# Patient Record
Sex: FEMALE | Race: WHITE | NOT HISPANIC OR LATINO
[De-identification: names, ages, dates, MRNs, and addresses within clinical notes are randomized per-mention and may not be internally consistent; named-entity substitution may affect disease eponyms.]

---

## 2022-01-27 ENCOUNTER — APPOINTMENT (OUTPATIENT)
Dept: RADIATION ONCOLOGY | Facility: CLINIC | Age: 62
End: 2022-01-27
Payer: COMMERCIAL

## 2022-01-27 VITALS
WEIGHT: 186 LBS | DIASTOLIC BLOOD PRESSURE: 97 MMHG | TEMPERATURE: 98 F | RESPIRATION RATE: 16 BRPM | HEART RATE: 79 BPM | HEIGHT: 65 IN | SYSTOLIC BLOOD PRESSURE: 157 MMHG | OXYGEN SATURATION: 99 % | BODY MASS INDEX: 30.99 KG/M2

## 2022-01-27 DIAGNOSIS — Z78.9 OTHER SPECIFIED HEALTH STATUS: ICD-10-CM

## 2022-01-27 DIAGNOSIS — Z87.39 PERSONAL HISTORY OF OTHER DISEASES OF THE MUSCULOSKELETAL SYSTEM AND CONNECTIVE TISSUE: ICD-10-CM

## 2022-01-27 DIAGNOSIS — Z86.39 PERSONAL HISTORY OF OTHER ENDOCRINE, NUTRITIONAL AND METABOLIC DISEASE: ICD-10-CM

## 2022-01-27 DIAGNOSIS — Z80.3 FAMILY HISTORY OF MALIGNANT NEOPLASM OF BREAST: ICD-10-CM

## 2022-01-27 DIAGNOSIS — Z86.79 PERSONAL HISTORY OF OTHER DISEASES OF THE CIRCULATORY SYSTEM: ICD-10-CM

## 2022-01-27 DIAGNOSIS — Z86.59 PERSONAL HISTORY OF OTHER MENTAL AND BEHAVIORAL DISORDERS: ICD-10-CM

## 2022-01-27 DIAGNOSIS — Z87.19 PERSONAL HISTORY OF OTHER DISEASES OF THE DIGESTIVE SYSTEM: ICD-10-CM

## 2022-01-27 PROBLEM — Z00.00 ENCOUNTER FOR PREVENTIVE HEALTH EXAMINATION: Status: ACTIVE | Noted: 2022-01-27

## 2022-01-27 PROCEDURE — 99204 OFFICE O/P NEW MOD 45 MIN: CPT | Mod: 25

## 2022-01-27 RX ORDER — FLUOXETINE HYDROCHLORIDE 10 MG/1
10 CAPSULE ORAL
Refills: 0 | Status: ACTIVE | COMMUNITY

## 2022-01-27 RX ORDER — METOPROLOL SUCCINATE 200 MG/1
TABLET, EXTENDED RELEASE ORAL
Refills: 0 | Status: ACTIVE | COMMUNITY

## 2022-01-27 RX ORDER — ROSUVASTATIN CALCIUM 10 MG/1
10 TABLET, FILM COATED ORAL
Refills: 0 | Status: ACTIVE | COMMUNITY

## 2022-01-27 NOTE — HISTORY OF PRESENT ILLNESS
[FreeTextEntry1] : Mrs. Looney is a 60 yo female who presents for a newly diagnosed Left breast DCIS. \par 10/11/2021 Screening Bilateral Mammogram which revealed 10 mm cluster of microcalcifications in the left breast 9:00. \par 10/19/2021 Right Targeted Breast US there was a 16 x 15 x 9 mm  solid mass right breast retroareolar. USGCBx recommended. \par 11/10/2021 Stereotactic Biopsy - Pathology revealed Left Breast DCIS, intermediate to high grade, solid pattern with necrosis and calcifications which was concordant, ans she underwent a targeted right breast US on 10/19/2021 which revealed a solid right breast retroareolar mass for which USGCBx was then performed on 11/2/2021 which revealed focal adenosis and columnar cell change with calcifications in a background of dense stromal fibrosis, this is benign and concordant.  \par 11/24/2021 MRI Bilateral Breasts ([facility]) revealed Left Breast: postbiopsy changes with a hematoma at 9:00. Nodular linear enhancement extending from the biopsy site, this may represent fibrocystic changes however. \par Right Breast: no evidence suspicious findings. \par 1/5/2022 (DR PHILLIPS)  She underwent a Left breast mag seed localized lumpectomy, local tissue transfer, mapping of left axillary sentinel lymph nodes. \par Pathology revealed DCIS, 23 mm, 4 mm, 3 mm, and 2 mm, solid, intermediate to high grade, central necrosis present, Distance from closest margins: 5 mm from final superior surface and distance from other margins > 10 mm, 1 SLN negative, ER- 1+ low positive, ND-. \par She saw Dr. Michaud on 1/26/2022 and discussed starting endocrine therapy after RT. \par She is feeling well since the surgery. She denies any breast pain. She has full ROM of the left arm. She has not had any issues since the surgery. She is currently having a flare up of her PMR. She is seeing the Rheumatologist today following this appointment.

## 2022-01-27 NOTE — OB/GYN HISTORY
[History of Hormone Replacement Therapy] : a history of hormone replacement therapy [Currently In Menopause] : currently in menopause [Menopause Age: ____] : patient was [unfilled] years old at menopause [___] : Living: [unfilled]

## 2022-01-27 NOTE — VITALS
[Maximal Pain Intensity: 0/10] : 0/10 [Least Pain Intensity: 0/10] : 0/10 [NoTreatment Scheduled] : no treatment scheduled [70: Cares for self; unalbe to carry on normal activity or do active work.] : 70: Cares for self; unable to carry on normal activity or do active work. [ECOG Performance Status: 2 - Ambulatory and capable of all self care but unable to carry out any work activities] : Performance Status: 2 - Ambulatory and capable of all self care but unable to carry out any work activities. Up and about more than 50% of waking hours [Date: ____________] : Patient's last distress assessment performed on [unfilled]. [4 - Distress Level] : Distress Level: 4

## 2022-01-27 NOTE — PHYSICAL EXAM
[Breast Appearance] : normal in appearance [Symmetric] : breasts are symmetric [Breast Palpation Mass] : no palpable masses [Breast Abnormal Lactation (Galactorrhea)] : no nipple discharge [No UE Edema] : there is no upper extremity edema [Normal] : oriented to person, place and time, the affect was normal, the mood was normal and not anxious [de-identified] : Well-healed lumpectomy scar on left side with no underlying induration.  No lymph nodes palpable in left axilla

## 2022-03-08 ENCOUNTER — NON-APPOINTMENT (OUTPATIENT)
Age: 62
End: 2022-03-08

## 2022-03-08 VITALS
HEIGHT: 65 IN | RESPIRATION RATE: 16 BRPM | BODY MASS INDEX: 32.65 KG/M2 | DIASTOLIC BLOOD PRESSURE: 86 MMHG | SYSTOLIC BLOOD PRESSURE: 132 MMHG | WEIGHT: 196 LBS | TEMPERATURE: 98.2 F | HEART RATE: 71 BPM | OXYGEN SATURATION: 97 %

## 2022-03-08 NOTE — DISEASE MANAGEMENT
[Pathological] : TNM Stage: p [0] : 0 [TTNM] : 0 [NTNM] : 0 [MTNM] : 0 [de-identified] : Patient completed fractions 2/16, total dose of 530 delivered to her left breast.

## 2022-03-08 NOTE — PHYSICAL EXAM
[Normal] : oriented to person, place and time, the affect was normal, the mood was normal and not anxious [de-identified] : No erythema seen

## 2022-03-08 NOTE — HISTORY OF PRESENT ILLNESS
[FreeTextEntry1] : 3/8/2022\par Patient completed fractions 2/16, total dose of 530 delivered to her left breast. Annmarie any skin rash or irritation. c/o fatigue 7, out of 10 due to her polyalgia rheumatica, increased her prednisone dosage. Skin care education provided. sample of Calendular cream and brochure provided. Patient verbalized understanding.

## 2022-03-08 NOTE — VITALS
[Maximal Pain Intensity: 1/10] : 1/10 [Least Pain Intensity: 1/10] : 1/10 [NoTreatment Scheduled] : no treatment scheduled [80: Normal activity with effort; some signs or symptoms of disease.] : 80: Normal activity with effort; some signs or symptoms of disease.  [ECOG Performance Status: 2 - Ambulatory and capable of all self care but unable to carry out any work activities] : Performance Status: 2 - Ambulatory and capable of all self care but unable to carry out any work activities. Up and about more than 50% of waking hours

## 2022-03-15 ENCOUNTER — NON-APPOINTMENT (OUTPATIENT)
Age: 62
End: 2022-03-15

## 2022-03-15 VITALS
RESPIRATION RATE: 17 BRPM | BODY MASS INDEX: 32.65 KG/M2 | OXYGEN SATURATION: 99 % | DIASTOLIC BLOOD PRESSURE: 94 MMHG | WEIGHT: 196 LBS | HEIGHT: 65 IN | HEART RATE: 64 BPM | SYSTOLIC BLOOD PRESSURE: 164 MMHG | TEMPERATURE: 98 F

## 2022-03-15 NOTE — PHYSICAL EXAM
[Normal] : oriented to person, place and time, the affect was normal, the mood was normal and not anxious [de-identified] : Minimal erythema of the breast.  No desquamation

## 2022-03-15 NOTE — HISTORY OF PRESENT ILLNESS
[FreeTextEntry1] : 3/8/2022\par Patient completed fractions 2/16, total dose of 530 delivered to her left breast. Annmarie any skin rash or irritation. c/o fatigue 7, out of 10 due to her polyalgia rheumatica, increased her prednisone dosage. Skin care education provided. sample of Calendular cream and brochure provided. Patient verbalized understanding.\par \par 3/15/2022\par Patient completed fractions 7/16, total dose of 1855 delivered to her left breast. Annmarie any pain, skin rash or irritation. She is using Calendula cream BID.

## 2022-03-15 NOTE — VITALS
[Maximal Pain Intensity: 1/10] : 1/10 [Least Pain Intensity: 1/10] : 1/10 [NoTreatment Scheduled] : no treatment scheduled [90: Able to carry normal activity; minor signs or symptoms of disease.] : 90: Able to carry normal activity; minor signs or symptoms of disease.  [ECOG Performance Status: 1 - Restricted in physically strenuous activity but ambulatory and able to carry out work of a light or sedentary nature] : Performance Status: 1 - Restricted in physically strenuous activity but ambulatory and able to carry out work of a light or sedentary nature, e.g., light house work, office work

## 2022-03-15 NOTE — DISEASE MANAGEMENT
[Pathological] : TNM Stage: p [0] : 0 [TTNM] : 0 [NTNM] : 0 [MTNM] : 0 [de-identified] : Patient completed fractions 7/16, total dose of 1855 delivered to her left breast.

## 2022-03-21 ENCOUNTER — NON-APPOINTMENT (OUTPATIENT)
Age: 62
End: 2022-03-21

## 2022-03-21 VITALS
WEIGHT: 197.5 LBS | DIASTOLIC BLOOD PRESSURE: 92 MMHG | HEIGHT: 65 IN | RESPIRATION RATE: 17 BRPM | TEMPERATURE: 98 F | BODY MASS INDEX: 32.9 KG/M2 | HEART RATE: 72 BPM | SYSTOLIC BLOOD PRESSURE: 139 MMHG | OXYGEN SATURATION: 100 %

## 2022-03-21 NOTE — PHYSICAL EXAM
[Sclera] : the sclera and conjunctiva were normal [] : no respiratory distress [Normal] : oriented to person, place and time, the affect was normal, the mood was normal and not anxious [de-identified] : Mild erythema left breast

## 2022-03-21 NOTE — HISTORY OF PRESENT ILLNESS
[FreeTextEntry1] : 3/21/2022\par Patient completed fractions 11/16, total dose of 2915 delivered to her left breast. c/o L breast soreness, redness, and sensitivity. She feel some soreness on her tung also. c/o fatigue 5, out of 10. Denies any pain, or loss of appetite.

## 2022-03-21 NOTE — REVIEW OF SYSTEMS
[Pruritus: Grade 1 - Mild or localized; topical intervention indicated] : Pruritus: Grade 1 - Mild or localized; topical intervention indicated [Dermatitis Radiation: Grade 1 - Faint erythema or dry desquamation] : Dermatitis Radiation: Grade 1 - Faint erythema or dry desquamation [Fatigue: Grade 0] : Fatigue: Grade 0 [Breast Pain: Grade 0] : Breast Pain: Grade 0

## 2022-03-21 NOTE — VITALS
[Maximal Pain Intensity: 1/10] : 1/10 [Least Pain Intensity: 1/10] : 1/10 [90: Able to carry normal activity; minor signs or symptoms of disease.] : 90: Able to carry normal activity; minor signs or symptoms of disease.  [ECOG Performance Status: 1 - Restricted in physically strenuous activity but ambulatory and able to carry out work of a light or sedentary nature] : Performance Status: 1 - Restricted in physically strenuous activity but ambulatory and able to carry out work of a light or sedentary nature, e.g., light house work, office work

## 2022-03-21 NOTE — DISEASE MANAGEMENT
[Pathological] : TNM Stage: p [0] : 0 [TTNM] : 0 [NTNM] : 0 [MTNM] : 0 [de-identified] : Patient completed fractions 11/16, total dose of 2915 delivered to her left breast.

## 2022-03-25 ENCOUNTER — NON-APPOINTMENT (OUTPATIENT)
Age: 62
End: 2022-03-25

## 2022-03-28 ENCOUNTER — NON-APPOINTMENT (OUTPATIENT)
Age: 62
End: 2022-03-28

## 2022-03-28 VITALS
BODY MASS INDEX: 32.54 KG/M2 | DIASTOLIC BLOOD PRESSURE: 84 MMHG | TEMPERATURE: 97.3 F | HEART RATE: 82 BPM | OXYGEN SATURATION: 97 % | HEIGHT: 65 IN | WEIGHT: 195.3 LBS | RESPIRATION RATE: 17 BRPM | SYSTOLIC BLOOD PRESSURE: 125 MMHG

## 2022-03-28 NOTE — REVIEW OF SYSTEMS
[Fatigue: Grade 0] : Fatigue: Grade 0 [Breast Pain: Grade 1 - Mild pain] : Breast Pain: Grade 1 - Mild pain [Pruritus: Grade 2 - Intense or widespread; intermittent; skin changes from scratching (e.g., edema, papulation, excoriations, lichenification, oozing/crusts); oral intervention indicated; limiting instrumental ADL] : Pruritus: Grade 2 - Intense or widespread; intermittent; skin changes from scratching (e.g., edema, papulation, excoriations, lichenification, oozing/crusts); oral intervention indicated; limiting instrumental ADL [Dermatitis Radiation: Grade 2 - Moderate to brisk erythema; patchy moist desquamation, mostly confined to skin folds and creases; moderate edema] : Dermatitis Radiation: Grade 2 - Moderate to brisk erythema; patchy moist desquamation, mostly confined to skin folds and creases; moderate edema

## 2022-03-28 NOTE — VITALS
[Maximal Pain Intensity: 2/10] : 2/10 [Least Pain Intensity: 2/10] : 2/10 [OTC] : OTC [80: Normal activity with effort; some signs or symptoms of disease.] : 80: Normal activity with effort; some signs or symptoms of disease.  [ECOG Performance Status: 2 - Ambulatory and capable of all self care but unable to carry out any work activities] : Performance Status: 2 - Ambulatory and capable of all self care but unable to carry out any work activities. Up and about more than 50% of waking hours

## 2022-03-29 NOTE — HISTORY OF PRESENT ILLNESS
[FreeTextEntry1] : 3/21/2022\par Patient completed fractions 11/16, total dose of 2915 delivered to her left breast. c/o L breast soreness, redness, and sensitivity. She feel some soreness on her tung also. c/o fatigue 5, out of 10. Denies any pain, or loss of appetite. \par \par 3/28/2022\par Patient completed fractions 16/16, total dose of 4240 cGy delivered to her left breast. c/o L upper inner dermatitis, opening skin tear under her L breast. Denies fatigue and loss of appetite.

## 2022-03-29 NOTE — PHYSICAL EXAM
[Sclera] : the sclera and conjunctiva were normal [Normal] : oriented to person, place and time, the affect was normal, the mood was normal and not anxious [de-identified] : Moderate erythema left breast with focal patch of moist desquamation

## 2022-03-29 NOTE — DISEASE MANAGEMENT
[Pathological] : TNM Stage: p [0] : 0 [TTNM] : 0 [NTNM] : 0 [MTNM] : 0 [de-identified] : Patient completed fractions 16/16, total dose of 4240 cGy delivered to her left breast.

## 2022-03-30 ENCOUNTER — NON-APPOINTMENT (OUTPATIENT)
Age: 62
End: 2022-03-30

## 2022-03-30 VITALS
TEMPERATURE: 97.1 F | SYSTOLIC BLOOD PRESSURE: 131 MMHG | HEART RATE: 66 BPM | HEIGHT: 65 IN | OXYGEN SATURATION: 100 % | WEIGHT: 196 LBS | RESPIRATION RATE: 18 BRPM | BODY MASS INDEX: 32.65 KG/M2 | DIASTOLIC BLOOD PRESSURE: 90 MMHG

## 2022-03-30 NOTE — REVIEW OF SYSTEMS
[Nausea: Grade 0] : Nausea: Grade 0 [Vomiting: Grade 0] : Vomiting: Grade 0 [Fatigue: Grade 0] : Fatigue: Grade 0 [Breast Pain: Grade 1 - Mild pain] : Breast Pain: Grade 1 - Mild pain [Pruritus: Grade 2 - Intense or widespread; intermittent; skin changes from scratching (e.g., edema, papulation, excoriations, lichenification, oozing/crusts); oral intervention indicated; limiting instrumental ADL] : Pruritus: Grade 2 - Intense or widespread; intermittent; skin changes from scratching (e.g., edema, papulation, excoriations, lichenification, oozing/crusts); oral intervention indicated; limiting instrumental ADL [Dermatitis Radiation: Grade 2 - Moderate to brisk erythema; patchy moist desquamation, mostly confined to skin folds and creases; moderate edema] : Dermatitis Radiation: Grade 2 - Moderate to brisk erythema; patchy moist desquamation, mostly confined to skin folds and creases; moderate edema

## 2022-03-30 NOTE — DISEASE MANAGEMENT
[Pathological] : TNM Stage: p [0] : 0 [TTNM] : 0 [NTNM] : 0 [MTNM] : 0 [de-identified] : Patient completed fractions 16/16, total dose of 4240 cGy, BOOST fractions 2/4, total dose of 500 cGy delivered to her left breast.

## 2022-03-30 NOTE — PHYSICAL EXAM
[Normal] : oriented to person, place and time, the affect was normal, the mood was normal and not anxious [de-identified] : Brisk erythema of the breast.  Small area of moist desquamation in the inframammary region

## 2022-03-30 NOTE — VITALS
[Maximal Pain Intensity: 7/10] : 7/10 [Least Pain Intensity: 5/10] : 5/10 [OTC] : OTC [80: Normal activity with effort; some signs or symptoms of disease.] : 80: Normal activity with effort; some signs or symptoms of disease.  [ECOG Performance Status: 2 - Ambulatory and capable of all self care but unable to carry out any work activities] : Performance Status: 2 - Ambulatory and capable of all self care but unable to carry out any work activities. Up and about more than 50% of waking hours

## 2022-03-30 NOTE — HISTORY OF PRESENT ILLNESS
[FreeTextEntry1] : 3/21/2022\par Patient completed fractions 11/16, total dose of 2915 delivered to her left breast. c/o L breast soreness, redness, and sensitivity. She feel some soreness on her tung also. c/o fatigue 5, out of 10. Denies any pain, or loss of appetite. \par \par 3/28/2022\par Patient completed fractions 16/16, total dose of 4240 cGy delivered to her left breast. c/o L upper inner dermatitis, opening skin tear under her L breast. Denies fatigue and loss of appetite.   \par \par 3/30/2022\par Patient completed fractions 16/16, total dose of 4240 cGy, BOOST fractions 2/4, total dose of 500 cGy delivered to her left breast. c/o excoriated skin tear under her L breast. pain 6, out of 10. c/o fatigue. She is using Aquaphor BID and gauze.

## 2022-04-04 ENCOUNTER — APPOINTMENT (OUTPATIENT)
Dept: RADIATION ONCOLOGY | Facility: CLINIC | Age: 62
End: 2022-04-04
Payer: COMMERCIAL

## 2022-04-04 VITALS
HEART RATE: 74 BPM | RESPIRATION RATE: 16 BRPM | OXYGEN SATURATION: 99 % | DIASTOLIC BLOOD PRESSURE: 84 MMHG | SYSTOLIC BLOOD PRESSURE: 136 MMHG

## 2022-04-04 PROCEDURE — 99024 POSTOP FOLLOW-UP VISIT: CPT

## 2022-04-04 NOTE — REVIEW OF SYSTEMS
[Breast Pain: Grade 1 - Mild pain] : Breast Pain: Grade 1 - Mild pain [Pruritus: Grade 1 - Mild or localized; topical intervention indicated] : Pruritus: Grade 1 - Mild or localized; topical intervention indicated [Skin Hyperpigmentation: Grade 2 - Hyperpigmentation covering >10% BSA; associated psychosocial impact] : Skin Hyperpigmentation: Grade 2 - Hyperpigmentation covering >10% BSA; associated psychosocial impact [Dermatitis Radiation: Grade 2 - Moderate to brisk erythema; patchy moist desquamation, mostly confined to skin folds and creases; moderate edema] : Dermatitis Radiation: Grade 2 - Moderate to brisk erythema; patchy moist desquamation, mostly confined to skin folds and creases; moderate edema

## 2022-04-04 NOTE — VITALS
[Maximal Pain Intensity: 2/10] : 2/10 [NoTreatment Scheduled] : no treatment scheduled [OTC] : OTC [80: Normal activity with effort; some signs or symptoms of disease.] : 80: Normal activity with effort; some signs or symptoms of disease.  [ECOG Performance Status: 1 - Restricted in physically strenuous activity but ambulatory and able to carry out work of a light or sedentary nature] : Performance Status: 1 - Restricted in physically strenuous activity but ambulatory and able to carry out work of a light or sedentary nature, e.g., light house work, office work

## 2022-04-04 NOTE — PHYSICAL EXAM
[Sclera] : the sclera and conjunctiva were normal [] : no respiratory distress [Symmetric] : breasts are symmetric [Normal] : oriented to person, place and time, the affect was normal, the mood was normal and not anxious [de-identified] : Moderate-severe erythema left breast; patchy moist desquamation limited to one-third of the inframammary fold region

## 2022-04-04 NOTE — DISEASE MANAGEMENT
[Pathological] : TNM Stage: p [0] : 0 [TTNM] : 0 [NTNM] : 0 [MTNM] : 0 [de-identified] : Patient completed fractions 16/16, total dose of 4240 cGy, BOOST fractions 4/4, total dose of 1000 cGy delivered to her left breast. on 3/30/2022

## 2022-05-13 ENCOUNTER — APPOINTMENT (OUTPATIENT)
Dept: RADIATION ONCOLOGY | Facility: CLINIC | Age: 62
End: 2022-05-13
Payer: COMMERCIAL

## 2022-05-13 PROCEDURE — 99024 POSTOP FOLLOW-UP VISIT: CPT

## 2022-05-13 NOTE — PHYSICAL EXAM
[Symmetric] : breasts are symmetric [Breast Palpation Mass] : no palpable masses [Breast Abnormal Lactation (Galactorrhea)] : no nipple discharge [Normal] : oriented to person, place and time, the affect was normal, the mood was normal and not anxious [de-identified] : No residual erythema or hyperpigmentation seen.  Well-healed lumpectomy scar on left side.  Excellent cosmetic outcome seen

## 2022-05-13 NOTE — DISEASE MANAGEMENT
[Pathological] : TNM Stage: p [0] : 0 [TTNM] : 0 [NTNM] : 0 [MTNM] : 0 [de-identified] : Patient completed fractions 16/16, total dose of 4240 cGy, BOOST fractions 4/4, total dose of 1000 cGy delivered to her left breast. on 3/30/2022

## 2022-05-13 NOTE — HISTORY OF PRESENT ILLNESS
[FreeTextEntry1] : 4/4/2022\par MS. Looney presents today for a PTE. She is c/o redness and soreness of the left breast. She has moist desquamation under the breast that is uncomfortable. She has been using the Aquaphor and Xerofoam dressings and has been trying to let it open to air to dry it out. She is looking for some comfort relief. Dr. Alberts to see the patient with the recommendation of Silvadene cream to the area. \par 5/13/22.  Pt is here today for a post treatment visit.   Patient received radiation therapy to her left breast to a dose of 4240 cGy in 16 fractions followed by 1000 cGy boost that she completed 1 month ago for DCIS\par \par For 1 month she has been having SOB and PERSON and Hot flashes.  She has had EKG MRI CT scan and angiogram  all negative . She gained 7 Lbs

## 2022-10-12 ENCOUNTER — TRANSCRIPTION ENCOUNTER (OUTPATIENT)
Age: 62
End: 2022-10-12

## 2022-10-12 ENCOUNTER — APPOINTMENT (OUTPATIENT)
Dept: RADIATION ONCOLOGY | Facility: CLINIC | Age: 62
End: 2022-10-12

## 2022-10-12 VITALS
RESPIRATION RATE: 16 BRPM | WEIGHT: 193 LBS | OXYGEN SATURATION: 99 % | DIASTOLIC BLOOD PRESSURE: 95 MMHG | SYSTOLIC BLOOD PRESSURE: 155 MMHG | HEART RATE: 74 BPM | BODY MASS INDEX: 32.12 KG/M2

## 2022-10-12 PROCEDURE — 99212 OFFICE O/P EST SF 10 MIN: CPT

## 2022-10-12 RX ORDER — PREDNISONE 10 MG
TABLET ORAL
Refills: 0 | Status: DISCONTINUED | COMMUNITY
End: 2022-10-12

## 2022-10-12 RX ORDER — SILVER SULFADIAZINE 10 MG/G
1 CREAM TOPICAL TWICE DAILY
Qty: 50 | Refills: 1 | Status: DISCONTINUED | COMMUNITY
Start: 2022-04-04 | End: 2022-10-12

## 2022-10-12 RX ORDER — METHOTREXATE 2.5 MG/1
TABLET ORAL
Refills: 0 | Status: DISCONTINUED | COMMUNITY
End: 2022-10-12

## 2022-10-12 NOTE — PHYSICAL EXAM
[Normal] : oriented to person, place and time, the affect was normal, the mood was normal and not anxious [de-identified] : Well-healed lumpectomy scar with no underlying induration.  No other lumps palpable.  No lymph nodes palpable in the axilla.  No residual hyperpigmentation or telangiectasia noted

## 2022-10-12 NOTE — DISEASE MANAGEMENT
[Pathological] : TNM Stage: p [0] : 0 [TTNM] : 0 [NTNM] : 0 [MTNM] : 0 [de-identified] : Patient completed fractions 16/16, total dose of 4240 cGy, BOOST fractions 4/4, total dose of 1000 cGy delivered to her left breast. on 3/30/2022

## 2022-10-12 NOTE — HISTORY OF PRESENT ILLNESS
[FreeTextEntry1] : DCIS of left breast, status postlumpectomy and radiation therapy completed 1 month ago. \par She has refused hormonal therapy.\par \par 4/4/2022\par MS. Looney presents today for a PTE. She is c/o redness and soreness of the left breast. She has moist desquamation under the breast that is uncomfortable. She has been using the Aquaphor and Xerofoam dressings and has been trying to let it open to air to dry it out. She is looking for some comfort relief. Dr. Alberts to see the patient with the recommendation of Silvadene cream to the area. \par 5/13/22. Pt is here today for a post treatment visit. Patient received radiation therapy to her left breast to a dose of 4240 cGy in 16 fractions followed by 1000 cGy boost that she completed 1 month ago for DCIS\par For 1 month she has been having SOB and PERSON and Hot flashes. She has had EKG MRI CT scan and angiogram all negative . She gained 7 Lbs \par \par 10/12/2022.  Today she is back for her 6 month f/u.  She will be getting a mammogram this Friday at [facility].  She has decided not to pursue  AI therapy and will therefore not see Dr Michaud further.  She denies any history of palpable lump in her breast.  No history of limitation of shoulder movements..Today she is feeling well but her psoriatic arthritis is flaring up.  \par  \par

## 2022-10-12 NOTE — VITALS
[Maximal Pain Intensity: 5/10] : 5/10 [Least Pain Intensity: 5/10] : 5/10 [Pain Location: ___] : Pain Location: [unfilled] [90: Able to carry normal activity; minor signs or symptoms of disease.] : 90: Able to carry normal activity; minor signs or symptoms of disease.

## 2023-04-19 ENCOUNTER — APPOINTMENT (OUTPATIENT)
Dept: RADIATION ONCOLOGY | Facility: CLINIC | Age: 63
End: 2023-04-19
Payer: COMMERCIAL

## 2023-04-19 VITALS — DIASTOLIC BLOOD PRESSURE: 102 MMHG | SYSTOLIC BLOOD PRESSURE: 168 MMHG

## 2023-04-19 VITALS — RESPIRATION RATE: 16 BRPM | OXYGEN SATURATION: 99 % | HEART RATE: 70 BPM

## 2023-04-19 DIAGNOSIS — D05.12 INTRADUCTAL CARCINOMA IN SITU OF LEFT BREAST: ICD-10-CM

## 2023-04-19 PROCEDURE — 99212 OFFICE O/P EST SF 10 MIN: CPT

## 2023-04-19 RX ORDER — CELECOXIB 100 MG/1
100 CAPSULE ORAL
Refills: 0 | Status: ACTIVE | COMMUNITY

## 2023-04-19 RX ORDER — ETANERCEPT 25 MG
KIT SUBCUTANEOUS
Refills: 0 | Status: DISCONTINUED | COMMUNITY
End: 2023-04-19

## 2023-04-19 NOTE — PHYSICAL EXAM
[Breast Appearance] : normal in appearance [Symmetric] : breasts are symmetric [Breast Palpation Mass] : no palpable masses [Breast Abnormal Lactation (Galactorrhea)] : no nipple discharge [No UE Edema] : there is no upper extremity edema [Normal] : oriented to person, place and time, the affect was normal, the mood was normal and not anxious [de-identified] : Well-healed lumpectomy scar on the left side with no underlying induration no other lumps palpable.  No lumps palpable in right breast.  Excellent cosmesis seen with no hyperpigmentation or telangiectasia

## 2024-05-08 ENCOUNTER — APPOINTMENT (OUTPATIENT)
Dept: RADIATION ONCOLOGY | Facility: CLINIC | Age: 64
End: 2024-05-08